# Patient Record
Sex: FEMALE | Race: WHITE | NOT HISPANIC OR LATINO | URBAN - METROPOLITAN AREA
[De-identification: names, ages, dates, MRNs, and addresses within clinical notes are randomized per-mention and may not be internally consistent; named-entity substitution may affect disease eponyms.]

---

## 2020-01-24 ENCOUNTER — TELEPHONE (OUTPATIENT)
Dept: OBSTETRICS AND GYNECOLOGY | Facility: CLINIC | Age: 30
End: 2020-01-24

## 2020-01-24 NOTE — TELEPHONE ENCOUNTER
Spoke to patient and she will check with her PCP.  She has medicaid and will call back with any pregnancy.

## 2020-01-24 NOTE — TELEPHONE ENCOUNTER
----- Message from Evelyne Garcia sent at 1/24/2020  9:26 AM CST -----  Contact: pt  States she took a pregnancy test on Monday, 1/20 and it was negative. States she's been spotting and having white discharge from her breat. She would like to schedule an appt. I didn't want too schedule her with a negative pregnancy test and make a new patient if she wasn't pregnant. Please call [phone]. Thank you